# Patient Record
Sex: MALE | Race: WHITE
[De-identification: names, ages, dates, MRNs, and addresses within clinical notes are randomized per-mention and may not be internally consistent; named-entity substitution may affect disease eponyms.]

---

## 2023-03-28 ENCOUNTER — APPOINTMENT (OUTPATIENT)
Dept: ORTHOPEDIC SURGERY | Facility: CLINIC | Age: 73
End: 2023-03-28
Payer: MEDICARE

## 2023-03-28 VITALS — WEIGHT: 175 LBS | BODY MASS INDEX: 27.15 KG/M2 | HEIGHT: 67.5 IN

## 2023-03-28 DIAGNOSIS — M48.061 SPINAL STENOSIS, LUMBAR REGION WITHOUT NEUROGENIC CLAUDICATION: ICD-10-CM

## 2023-03-28 DIAGNOSIS — Z85.6 PERSONAL HISTORY OF LEUKEMIA: ICD-10-CM

## 2023-03-28 PROCEDURE — 99204 OFFICE O/P NEW MOD 45 MIN: CPT

## 2023-03-28 NOTE — HISTORY OF PRESENT ILLNESS
[de-identified] : The patient is a 72 year old male who presents today complaining of lower back pain  \par Date of Injury/Onset: 3/10/23\par Pain:    At Rest: 0/10 \par With Activity:  2-3/10 \par Mechanism of injury: Patient reports doing a partial split and felt a severe strain, tearing his hamstring and caused a femoral nerve\par Quality of symptoms: Aching/ Radiating to left leg \par Improves with: Walking/ Naproxen \par Worse with:  At the end of they day\par Prior treatment: Dr. Crawford\par Prior Imaging:  MRI @ \par School/Sport/Position/Occupation: Works from home\par Additional Information: \par \par \par 3/28/2023: 72 year old male here with complaints of lower back pain and L3 anterior thigh numbness. On 3/10/2023, pt accidently stepped on soccer ball and did a lateral split. He had lower back pain, anterior thigh pain and numbness L3 distribution. He is taking Naproxen at night. No PT, chiro, acupuncture or injections yet. No prior complaints of pain. Significant limb length discrepancy due to prior right knee arthrodesis. No c/o numbness or paraesthesias. No bowel or bladder incontinence. No gait disturbance.\par \par PMH: CLL, disseminated TB\par PSH: excision right epiphysis (femoral and tibial) and arthrodesis

## 2023-03-28 NOTE — ASSESSMENT
[FreeTextEntry1] : 72 M with axial low back pain and lle radiculopathy L2 and L3 that is resolving.\par C/w NSAIDS OTC\par PT\par FU 6 weeks\par if pain persists will send for Pain management referral\par

## 2023-03-28 NOTE — IMAGING
[de-identified] : Lumbar Spine:\par Inspection/Palpation:\par No tenderness to palpation throughout Cervical/thoracic/lumbar spine.\par No bony step offs, No lesions.\par \par Gait:\par Non-antalgic, able to perform bilateral toe and heel rise. Able to perform tandem gait. \par \par Range of Motion:\par Lumbar Spine: Flexion to 90 degrees, Extension to 30 degrees, rotation 30 degrees bilaterally, lateral flexion to 30 degrees bilaterally.\par \par Neurologic:\par Bilateral Lower Extremities 5/5 Iliopsoas/Quadriceps/Hamstrings/ Tibialis Anterior/ Gastrocnemius. Extensor Hallucis Longus/ Flexor Hallucis Longus. Unable to assess right quadriceps due to arthrodesis. \par \par Sensation intact to light touch L2-S1\par \par Patellar/ Achilles reflex within normal limits, Unable to assess right quadriceps due to arthrodesis. \par \par Negative straight leg raise\par \par No pain with IR/ER/Flexion of Hips bilaterally \par \par \par MRI Lumbar spine reviewed and interpreted independently.  Agree with report as follows. \par multielevel degeenrateive changes.  Spondylolisthiest at l3-5.  L2-3 HNP with left sided extruded fragment.  Spinal stenosis from L3-5.  \par \par Scoliosis and multilevel degenerative disc disease.\par \par Disc bulge with superimposed moderate left paracentral disc herniation 1-2 with\par left L2 nerve root impingement and mild spinal stenosis.\par \par Extruded left foraminal disc herniation L2-3 with left L2 and L3 nerve root\par impingement. There is multifactorial mild spinal stenosis and right foraminal\par compromise.\par \par Grade 1 spondylolisthesis L3-4 with multifactorial moderate spinal stenosis.\par There is right greater than left lateral recess stenosis and right greater than\par left L4 nerve root impingement.\par \par Moderate disc bulge L4-5 with facet arthropathy, moderate spinal stenosis and\par suspected bilateral L5 nerve root impingement. There is suspected a right-sided\par synovial cyst and there is moderate bilateral foraminal compromise as well.\par \par Minimal disc bulge and right greater than left facet arthropathy, L5/S1.\par \par [facility] pelvis from [facility] also reviewed.  Hamstring tendionis and partial thickness tear.  Also with lymphadneopathy.  Lielky secondayr to his CLL. \par Mild bilateral SI joint arthrosis. Limited evaluation of the hips demonstrates\par suggestion of bilateral femoral acetabular arthrosis and joint effusions.\par \par Bilateral common hamstring tendinosis. There is a small partial-thickness tear\par at the left common hamstring origin.\par \par Limited evaluation of the lumbar spine demonstrates disc disease and facet\par arthrosis, better appreciated on the dedicated lumbar spine MRI.\par \par Probable retroperitoneal lymphadenopathy abutting the iliac vasculature, not\par fully evaluated. Dedicated CT of the abdomen and pelvis is recommended for\par further evaluation.\par

## 2023-05-12 ENCOUNTER — APPOINTMENT (OUTPATIENT)
Dept: ORTHOPEDIC SURGERY | Facility: CLINIC | Age: 73
End: 2023-05-12

## 2025-06-10 ENCOUNTER — APPOINTMENT (OUTPATIENT)
Dept: ORTHOPEDIC SURGERY | Facility: CLINIC | Age: 75
End: 2025-06-10
Payer: MEDICARE

## 2025-06-10 VITALS — WEIGHT: 165 LBS | HEIGHT: 67 IN | BODY MASS INDEX: 25.9 KG/M2

## 2025-06-10 PROCEDURE — 72100 X-RAY EXAM L-S SPINE 2/3 VWS: CPT

## 2025-06-10 PROCEDURE — 99213 OFFICE O/P EST LOW 20 MIN: CPT

## 2025-06-10 RX ORDER — METHYLPREDNISOLONE 4 MG/1
4 TABLET ORAL
Qty: 1 | Refills: 0 | Status: ACTIVE | COMMUNITY
Start: 2025-06-10 | End: 1900-01-01

## 2025-06-10 RX ORDER — MELOXICAM 15 MG/1
15 TABLET ORAL DAILY
Qty: 30 | Refills: 1 | Status: ACTIVE | COMMUNITY
Start: 2025-06-10 | End: 1900-01-01

## 2025-07-22 ENCOUNTER — APPOINTMENT (OUTPATIENT)
Dept: ORTHOPEDIC SURGERY | Facility: CLINIC | Age: 75
End: 2025-07-22

## 2025-08-05 ENCOUNTER — RX RENEWAL (OUTPATIENT)
Age: 75
End: 2025-08-05